# Patient Record
Sex: MALE | Race: WHITE | Employment: OTHER | URBAN - METROPOLITAN AREA
[De-identification: names, ages, dates, MRNs, and addresses within clinical notes are randomized per-mention and may not be internally consistent; named-entity substitution may affect disease eponyms.]

---

## 2019-06-14 ENCOUNTER — HOSPITAL ENCOUNTER (OUTPATIENT)
Dept: PREADMISSION TESTING | Age: 79
Discharge: HOME | End: 2019-06-14
Payer: MEDICARE

## 2019-06-14 VITALS
WEIGHT: 166.25 LBS | TEMPERATURE: 98.1 F | SYSTOLIC BLOOD PRESSURE: 161 MMHG | OXYGEN SATURATION: 96 % | HEIGHT: 67 IN | HEART RATE: 62 BPM | DIASTOLIC BLOOD PRESSURE: 58 MMHG | BODY MASS INDEX: 26.09 KG/M2

## 2019-06-14 LAB
ABO + RH BLD: NORMAL
ANION GAP SERPL CALC-SCNC: 8 MMOL/L (ref 5–15)
APPEARANCE UR: CLEAR
ATRIAL RATE: 54 BPM
BACTERIA URNS QL MICRO: NEGATIVE /HPF
BILIRUB UR QL: NEGATIVE
BLOOD GROUP ANTIBODIES SERPL: NORMAL
BUN SERPL-MCNC: 20 MG/DL (ref 6–20)
BUN/CREAT SERPL: 19 (ref 12–20)
CALCIUM SERPL-MCNC: 8.9 MG/DL (ref 8.5–10.1)
CALCULATED P AXIS, ECG09: 50 DEGREES
CALCULATED R AXIS, ECG10: -20 DEGREES
CALCULATED T AXIS, ECG11: 16 DEGREES
CHLORIDE SERPL-SCNC: 104 MMOL/L (ref 97–108)
CO2 SERPL-SCNC: 26 MMOL/L (ref 21–32)
COLOR UR: NORMAL
CREAT SERPL-MCNC: 1.05 MG/DL (ref 0.7–1.3)
DIAGNOSIS, 93000: NORMAL
EPITH CASTS URNS QL MICRO: NORMAL /LPF
ERYTHROCYTE [DISTWIDTH] IN BLOOD BY AUTOMATED COUNT: 12.3 % (ref 11.5–14.5)
EST. AVERAGE GLUCOSE BLD GHB EST-MCNC: 117 MG/DL
GLUCOSE SERPL-MCNC: 108 MG/DL (ref 65–100)
GLUCOSE UR STRIP.AUTO-MCNC: NEGATIVE MG/DL
HBA1C MFR BLD: 5.7 % (ref 4.2–6.3)
HCT VFR BLD AUTO: 40.5 % (ref 36.6–50.3)
HGB BLD-MCNC: 14 G/DL (ref 12.1–17)
HGB UR QL STRIP: NEGATIVE
HYALINE CASTS URNS QL MICRO: NORMAL /LPF (ref 0–5)
INR PPP: 1 (ref 0.9–1.1)
KETONES UR QL STRIP.AUTO: NEGATIVE MG/DL
LEUKOCYTE ESTERASE UR QL STRIP.AUTO: NEGATIVE
MCH RBC QN AUTO: 31.5 PG (ref 26–34)
MCHC RBC AUTO-ENTMCNC: 34.6 G/DL (ref 30–36.5)
MCV RBC AUTO: 91 FL (ref 80–99)
NITRITE UR QL STRIP.AUTO: NEGATIVE
NRBC # BLD: 0 K/UL (ref 0–0.01)
NRBC BLD-RTO: 0 PER 100 WBC
P-R INTERVAL, ECG05: 174 MS
PH UR STRIP: 7 [PH] (ref 5–8)
PLATELET # BLD AUTO: 266 K/UL (ref 150–400)
PMV BLD AUTO: 11.2 FL (ref 8.9–12.9)
POTASSIUM SERPL-SCNC: 4.1 MMOL/L (ref 3.5–5.1)
PROT UR STRIP-MCNC: NEGATIVE MG/DL
PROTHROMBIN TIME: 9.9 SEC (ref 9–11.1)
Q-T INTERVAL, ECG07: 436 MS
QRS DURATION, ECG06: 94 MS
QTC CALCULATION (BEZET), ECG08: 413 MS
RBC # BLD AUTO: 4.45 M/UL (ref 4.1–5.7)
RBC #/AREA URNS HPF: NORMAL /HPF (ref 0–5)
SODIUM SERPL-SCNC: 138 MMOL/L (ref 136–145)
SP GR UR REFRACTOMETRY: 1.02 (ref 1–1.03)
SPECIMEN EXP DATE BLD: NORMAL
UA: UC IF INDICATED,UAUC: NORMAL
UROBILINOGEN UR QL STRIP.AUTO: 1 EU/DL (ref 0.2–1)
VENTRICULAR RATE, ECG03: 54 BPM
WBC # BLD AUTO: 6.1 K/UL (ref 4.1–11.1)
WBC URNS QL MICRO: NORMAL /HPF (ref 0–4)

## 2019-06-14 PROCEDURE — 81001 URINALYSIS AUTO W/SCOPE: CPT

## 2019-06-14 PROCEDURE — 36415 COLL VENOUS BLD VENIPUNCTURE: CPT

## 2019-06-14 PROCEDURE — 80048 BASIC METABOLIC PNL TOTAL CA: CPT

## 2019-06-14 PROCEDURE — 83036 HEMOGLOBIN GLYCOSYLATED A1C: CPT

## 2019-06-14 PROCEDURE — 85610 PROTHROMBIN TIME: CPT

## 2019-06-14 PROCEDURE — 85027 COMPLETE CBC AUTOMATED: CPT

## 2019-06-14 PROCEDURE — 93005 ELECTROCARDIOGRAM TRACING: CPT

## 2019-06-14 PROCEDURE — 86900 BLOOD TYPING SEROLOGIC ABO: CPT

## 2019-06-14 RX ORDER — LOSARTAN POTASSIUM 100 MG/1
100 TABLET ORAL
COMMUNITY

## 2019-06-14 RX ORDER — ZINC GLUCONATE 10 MG
1 LOZENGE ORAL
COMMUNITY

## 2019-06-14 NOTE — PERIOP NOTES
Preoperative instructions reviewed with patient. Patient given two bottles CHG soap. Instructions reviewed on use of CHG soap. Patient given SSI infection sheet and hand washing tips sheets. MRSA/MSSA treatment instruction sheet given with an explanation to patient that they  will be notified if treatment instructions need to be initiated. Patient was given the opportunity to ask questions on the information provided.

## 2019-06-15 LAB
BACTERIA SPEC CULT: NORMAL
BACTERIA SPEC CULT: NORMAL
SERVICE CMNT-IMP: NORMAL

## 2019-06-20 NOTE — H&P
Brittney Durham  Location: - [facility] OFFICE  Patient #: [id number]  : 1940   / Language: [place] / Race: White  Male      History of Present Illness Teja Payton MD; 2019 4:30 PM)  The patient is a 78year old male who presents for a recheck of Knee Pain. The onset of the knee pain has been gradual and has been occurring in a persistent pattern for months. The course has been gradually worsening. The knee pain is moderate. The knee pain is characterized as a dull aching. The knee pain is described as being located in the entire knee (left). The knee pain is aggravated by kneeling. The knee pain is relieved by rest. Note for \"Knee Pain\": Patient's chief complaint is left knee pain.  We have treated him for several years nonoperatively for his left knee.  He states today that it is time to consider having his knee fixed.  Pain is along the medial joint line but more diffuse.  It is associated with swelling and stiffness.  He has lost ability to perform activities of daily living. [facility] pleased with his right partial knee. Problem List/Past Medical Adriana Mays; 2019 4:03 PM)  Essential Hypertension    OA, KNEE (715.16)    PAIN, KNEE (719.16)    MMT, ACUTE (836.0)    EFFUSION, KNEE (719.06)    Status post right partial knee replacement (V43.65  Z96.651)    REVIEW OF SYSTEMS: Systems were reviewed by the provider.    DIETARY COUNSELING (V65.3)    Primary osteoarthritis of left knee (715.16  M17.12)      Allergies Adriana Mays; 2019 4:03 PM)  No Known Drug Allergies  [2013]:  No Known Allergies  [2013]: Allergies Reconciled      Family History Adriana Ly CMA; 2019 4:03 PM)  Hypertension    Arthritis    Cancer    Colon Cancer      Social History Adriana Ly CMA; 2019 4:03 PM)  Caffeine use   2013: Drinks coffee, tea and soft drinks 1-2 times a day.   Alcohol use   2013: Drinks beer, wine and liquor 5 times per week having 1-2 drinks per occasion, rarely having more than 5 drinks per occasion. Illicit drug use   63/91/5749: none  Seat Belt Use   11/07/2013: always  Sun Exposure   11/07/2013: occasionally  Tobacco use   Former smoker. HIV risk factors   11/07/2013: no  Tobacco / smoke exposure   11/07/2013: No  Exercise   11/07/2013: Walks 3-4 times a week. Medication History Mona GASPARArin Ly [facility]; 4/16/2019 4:03 PM)  HydroCHLOROthiazide  (12.5MG Capsule, Oral) Active. Pravastatin Sodium  (10MG Tablet, Oral) Active. Medications Reconciled     Past Surgical History Mona BHATT Dana Sherie; 4/16/2019 4:03 PM)  Tonsillectomy    Vasectomy    Hand Surgery For Arthritis    Prostatectomy; Abdominal      Diagnostic Studies History Mona Rehman; 4/16/2019 4:03 PM)  Knee X-ray   Date: 4/2/2015, Results: . 3 views of the right knee were obtained. 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 He is now bone-on-bone in medial compartment. Other Problems Mona Abigail Ly [facility]; 4/16/2019 4:03 PM)  Prostate Cancer    Hypercholesterolemia    Skin Cancer          Review of Systems Mona GASPARArin Ly [facility]; 4/16/2019 4:03 PM)  General Not Present- Appetite Loss, Chills, Fatigue, Fever, HIV Exposure, Night Sweats, Persistent Infections, Seasonal Allergies, Weight Gain and Weight Loss. Skin Not Present- Itching, Nail Changes, Poor Wound Healing, Rash, Skin Color Changes, Suspicious Lesions and Yellowish Skin Color. HEENT Present- Decreased Hearing. Not Present- Double Vision, Earache, Hoarseness, Jaundice/Yellow Eyes, Loose Teeth, Nose Bleed, Ringing in the Ears and Sore Throat. Respiratory Not Present- Bloody sputum, Chronic Cough, Difficulty Breathing, Snoring, Wakes up from Sleep Wheezing or Short of Breath and Wheezing.   Cardiovascular Not Present- Bluish Discoloration Of Lips Or Nails, Chest Pain, Difficulty Breathing Lying Down, Difficulty Breathing On Exertion, Leg Cramps With Exertion, Palpitations and Swelling of Extremities. Gastrointestinal Not Present- Abdominal Pain, Black, Tarry Stool, Change in Bowel Habits, Cirrhosis, Constipation, Diarrhea, Difficulty Swallowing, Nausea and Vomiting. Male Genitourinary Not Present- Blood in Urine, Frequency, Painful Urination, Pelvic Pain, Trouble Starting Urinary Stream and Urgency. Musculoskeletal Present- Joint Pain. Not Present- Back Pain, Joint Stiffness and Joint Swelling. Neurological Present- Tingling. Not Present- Fainting, Headaches, Memory Loss, Numbness, Seizures, Tremor, Unsteadiness and Weakness. Psychiatric Not Present- Anxiety, Bipolar and Depression. Endocrine Not Present- Cold Intolerance, Excessive Hunger, Excessive Thirst, Excessive Urination and Heat Intolerance. Hematology Not Present- Abnormal Bruising , Enlarged Lymph Nodes, Excessive bleeding and Skin Discoloration. Vitals Ressie Hui Ly [facility]; 4/16/2019 4:03 PM)  4/16/2019 4:02 PM  Weight: 165 lb   Height: 67 in   Weight was reported by patient. Height was reported by patient. Body Surface Area: 1.86 m²   Body Mass Index: 25.84 kg/m²                Physical Exam Kristine Urias MD; 4/16/2019 4:32 PM)  Musculoskeletal  Global Assessment  Examination of related systems reveals - well-developed, well-nourished, in no acute distress, alert and oriented x 3, no rashes or ulcers of bilateral upper and lower extremities, head or trunk, no generalized swelling or edema of extremities, no digital clubbing or cyanosis, neurovascularly intact globally with normal deep tendon reflexes and normal coordination. Left Lower Extremity - Note: Hip was examined and has painless range of motion with negative impingement and apprehension sign. Straight leg raise and femoral nerve stretch test are negative. Lower extremity has palpable dorsalis pedis pulse. Skin is intact and foot is sensate and well-perfused.  Knee was examined and is ligamentously stable x4. Varus alignment which is somewhat correctable. Varus is about 5°. Pain is present diffusely but mostly along the medial joint line. There is a moderate-sized effusion. His range of motion is 3-115°. Motor testing reveals 5 out of 5 strength of the hip flexors, quads, ankle plantar flexors, and ankle dorsiflexors. Assessment & Plan Viry Vu MD; 4/16/2019 4:34 PM)  Primary osteoarthritis of left knee (715.16  M17.12)  Impression: End-stage medial compartment DJD left knee. Questions were answered. Left partial versus total knee was scheduled. All questions were answered. Current Plans  Pt Education - How to access health information online: discussed with patient and provided information. Pt Education - Educational materials were provided.: discussed with patient and provided information. X-RAY EXAM OF KNEE 3 VIEWS ([id number]) (left PA flex, standing Lateral and bilateral Lavina Patella views were taken today using Digital Radiography.  3 x-ray views of the patient's left knee show bone-on-bone in the medial compartment with medial compartment cysts and osteophyt)  REVIEW OF SYSTEMS: Systems were reviewed by the provider.(V49.9)        Signed by Viry Vu MD

## 2019-06-24 ENCOUNTER — ANESTHESIA EVENT (OUTPATIENT)
Dept: SURGERY | Age: 79
End: 2019-06-24
Payer: MEDICARE

## 2019-06-25 ENCOUNTER — ANESTHESIA (OUTPATIENT)
Dept: SURGERY | Age: 79
End: 2019-06-25
Payer: MEDICARE

## 2019-06-25 ENCOUNTER — HOSPITAL ENCOUNTER (OUTPATIENT)
Age: 79
Setting detail: OBSERVATION
Discharge: HOME HEALTH | End: 2019-06-26
Attending: ORTHOPAEDIC SURGERY | Admitting: ORTHOPAEDIC SURGERY
Payer: MEDICARE

## 2019-06-25 DIAGNOSIS — M17.12 PRIMARY OSTEOARTHRITIS OF LEFT KNEE: Primary | ICD-10-CM

## 2019-06-25 LAB
GLUCOSE BLD STRIP.AUTO-MCNC: 122 MG/DL (ref 65–100)
SERVICE CMNT-IMP: ABNORMAL

## 2019-06-25 PROCEDURE — C9290 INJ, BUPIVACAINE LIPOSOME: HCPCS | Performed by: ORTHOPAEDIC SURGERY

## 2019-06-25 PROCEDURE — 77030027138 HC INCENT SPIROMETER -A

## 2019-06-25 PROCEDURE — 74011250637 HC RX REV CODE- 250/637: Performed by: PHYSICIAN ASSISTANT

## 2019-06-25 PROCEDURE — 74011000258 HC RX REV CODE- 258: Performed by: ORTHOPAEDIC SURGERY

## 2019-06-25 PROCEDURE — 77030012935 HC DRSG AQUACEL BMS -B: Performed by: ORTHOPAEDIC SURGERY

## 2019-06-25 PROCEDURE — 77030018836 HC SOL IRR NACL ICUM -A: Performed by: ORTHOPAEDIC SURGERY

## 2019-06-25 PROCEDURE — 74011250636 HC RX REV CODE- 250/636: Performed by: ANESTHESIOLOGY

## 2019-06-25 PROCEDURE — 74011250636 HC RX REV CODE- 250/636

## 2019-06-25 PROCEDURE — 99218 HC RM OBSERVATION: CPT

## 2019-06-25 PROCEDURE — 74011250636 HC RX REV CODE- 250/636: Performed by: ORTHOPAEDIC SURGERY

## 2019-06-25 PROCEDURE — 77030006822 HC BLD SAW SAG BRSM -B: Performed by: ORTHOPAEDIC SURGERY

## 2019-06-25 PROCEDURE — C1713 ANCHOR/SCREW BN/BN,TIS/BN: HCPCS | Performed by: ORTHOPAEDIC SURGERY

## 2019-06-25 PROCEDURE — 97116 GAIT TRAINING THERAPY: CPT

## 2019-06-25 PROCEDURE — 74011000250 HC RX REV CODE- 250: Performed by: ORTHOPAEDIC SURGERY

## 2019-06-25 PROCEDURE — 77030014125 HC TY EPDRL BBMI -B: Performed by: ANESTHESIOLOGY

## 2019-06-25 PROCEDURE — 77030011640 HC PAD GRND REM COVD -A: Performed by: ORTHOPAEDIC SURGERY

## 2019-06-25 PROCEDURE — C1776 JOINT DEVICE (IMPLANTABLE): HCPCS | Performed by: ORTHOPAEDIC SURGERY

## 2019-06-25 PROCEDURE — 74011000250 HC RX REV CODE- 250

## 2019-06-25 PROCEDURE — 77030020782 HC GWN BAIR PAWS FLX 3M -B

## 2019-06-25 PROCEDURE — 77030031139 HC SUT VCRL2 J&J -A: Performed by: ORTHOPAEDIC SURGERY

## 2019-06-25 PROCEDURE — 77030032490 HC SLV COMPR SCD KNE COVD -B

## 2019-06-25 PROCEDURE — 77030000032 HC CUF TRNQT ZIMM -B: Performed by: ORTHOPAEDIC SURGERY

## 2019-06-25 PROCEDURE — 77030003601 HC NDL NRV BLK BBMI -A

## 2019-06-25 PROCEDURE — 74011250637 HC RX REV CODE- 250/637: Performed by: ORTHOPAEDIC SURGERY

## 2019-06-25 PROCEDURE — 94760 N-INVAS EAR/PLS OXIMETRY 1: CPT

## 2019-06-25 PROCEDURE — 77030010788: Performed by: ORTHOPAEDIC SURGERY

## 2019-06-25 PROCEDURE — 77030028907 HC WRP KNEE WO BGS SOLM -B

## 2019-06-25 PROCEDURE — 76210000006 HC OR PH I REC 0.5 TO 1 HR: Performed by: ORTHOPAEDIC SURGERY

## 2019-06-25 PROCEDURE — 82962 GLUCOSE BLOOD TEST: CPT

## 2019-06-25 PROCEDURE — 97161 PT EVAL LOW COMPLEX 20 MIN: CPT

## 2019-06-25 PROCEDURE — 74011250636 HC RX REV CODE- 250/636: Performed by: PHYSICIAN ASSISTANT

## 2019-06-25 PROCEDURE — 76010000162 HC OR TIME 1.5 TO 2 HR INTENSV-TIER 1: Performed by: ORTHOPAEDIC SURGERY

## 2019-06-25 PROCEDURE — 77030008467 HC STPLR SKN COVD -B: Performed by: ORTHOPAEDIC SURGERY

## 2019-06-25 PROCEDURE — 77030035236 HC SUT PDS STRATFX BARB J&J -B: Performed by: ORTHOPAEDIC SURGERY

## 2019-06-25 PROCEDURE — 76060000034 HC ANESTHESIA 1.5 TO 2 HR: Performed by: ORTHOPAEDIC SURGERY

## 2019-06-25 PROCEDURE — 77030018673: Performed by: ORTHOPAEDIC SURGERY

## 2019-06-25 DEVICE — SYSTEM PART REPL STD CEM POLY ZUK: Type: IMPLANTABLE DEVICE | Site: KNEE | Status: FUNCTIONAL

## 2019-06-25 DEVICE — TIBIA BASE SIZE 5 LEFT                                    MEDIAL/RIGHT LATERAL
Type: IMPLANTABLE DEVICE | Site: KNEE | Status: FUNCTIONAL
Brand: ZUK

## 2019-06-25 DEVICE — FEMORAL SIZE E LT MEDIAL/RT LATERAL
Type: IMPLANTABLE DEVICE | Site: KNEE | Status: FUNCTIONAL
Brand: ZUK

## 2019-06-25 DEVICE — ARTICULAR SURFACE SIZE 5 9MM
Type: IMPLANTABLE DEVICE | Site: KNEE | Status: FUNCTIONAL
Brand: ZUK

## 2019-06-25 DEVICE — CEMENT BNE 40GM FULL DOSE PMMA W/ GENT HI VISC RADPQ LNG: Type: IMPLANTABLE DEVICE | Site: KNEE | Status: FUNCTIONAL

## 2019-06-25 RX ORDER — FENTANYL CITRATE 50 UG/ML
25 INJECTION, SOLUTION INTRAMUSCULAR; INTRAVENOUS
Status: DISCONTINUED | OUTPATIENT
Start: 2019-06-25 | End: 2019-06-25 | Stop reason: HOSPADM

## 2019-06-25 RX ORDER — DIPHENHYDRAMINE HYDROCHLORIDE 50 MG/ML
12.5 INJECTION, SOLUTION INTRAMUSCULAR; INTRAVENOUS AS NEEDED
Status: DISCONTINUED | OUTPATIENT
Start: 2019-06-25 | End: 2019-06-25 | Stop reason: HOSPADM

## 2019-06-25 RX ORDER — ONDANSETRON 2 MG/ML
INJECTION INTRAMUSCULAR; INTRAVENOUS AS NEEDED
Status: DISCONTINUED | OUTPATIENT
Start: 2019-06-25 | End: 2019-06-25 | Stop reason: HOSPADM

## 2019-06-25 RX ORDER — HYDROMORPHONE HYDROCHLORIDE 1 MG/ML
0.2 INJECTION, SOLUTION INTRAMUSCULAR; INTRAVENOUS; SUBCUTANEOUS
Status: ACTIVE | OUTPATIENT
Start: 2019-06-25 | End: 2019-06-25

## 2019-06-25 RX ORDER — CEFAZOLIN SODIUM/WATER 2 G/20 ML
2 SYRINGE (ML) INTRAVENOUS EVERY 8 HOURS
Status: COMPLETED | OUTPATIENT
Start: 2019-06-25 | End: 2019-06-25

## 2019-06-25 RX ORDER — ONDANSETRON 2 MG/ML
4 INJECTION INTRAMUSCULAR; INTRAVENOUS AS NEEDED
Status: DISCONTINUED | OUTPATIENT
Start: 2019-06-25 | End: 2019-06-25 | Stop reason: HOSPADM

## 2019-06-25 RX ORDER — MIDAZOLAM HYDROCHLORIDE 1 MG/ML
1 INJECTION, SOLUTION INTRAMUSCULAR; INTRAVENOUS AS NEEDED
Status: DISCONTINUED | OUTPATIENT
Start: 2019-06-25 | End: 2019-06-25 | Stop reason: HOSPADM

## 2019-06-25 RX ORDER — MIDAZOLAM HYDROCHLORIDE 1 MG/ML
INJECTION, SOLUTION INTRAMUSCULAR; INTRAVENOUS AS NEEDED
Status: DISCONTINUED | OUTPATIENT
Start: 2019-06-25 | End: 2019-06-25 | Stop reason: HOSPADM

## 2019-06-25 RX ORDER — ACETAMINOPHEN 500 MG
1000 TABLET ORAL EVERY 6 HOURS
Status: DISCONTINUED | OUTPATIENT
Start: 2019-06-25 | End: 2019-06-26 | Stop reason: HOSPADM

## 2019-06-25 RX ORDER — SODIUM CHLORIDE, SODIUM LACTATE, POTASSIUM CHLORIDE, CALCIUM CHLORIDE 600; 310; 30; 20 MG/100ML; MG/100ML; MG/100ML; MG/100ML
100 INJECTION, SOLUTION INTRAVENOUS CONTINUOUS
Status: DISCONTINUED | OUTPATIENT
Start: 2019-06-25 | End: 2019-06-25 | Stop reason: HOSPADM

## 2019-06-25 RX ORDER — CELECOXIB 200 MG/1
200 CAPSULE ORAL EVERY 12 HOURS
Status: DISCONTINUED | OUTPATIENT
Start: 2019-06-25 | End: 2019-06-26 | Stop reason: HOSPADM

## 2019-06-25 RX ORDER — LOSARTAN POTASSIUM 50 MG/1
100 TABLET ORAL
Status: DISCONTINUED | OUTPATIENT
Start: 2019-06-25 | End: 2019-06-26 | Stop reason: HOSPADM

## 2019-06-25 RX ORDER — SODIUM CHLORIDE 9 MG/ML
25 INJECTION, SOLUTION INTRAVENOUS CONTINUOUS
Status: DISCONTINUED | OUTPATIENT
Start: 2019-06-25 | End: 2019-06-25 | Stop reason: HOSPADM

## 2019-06-25 RX ORDER — EPHEDRINE SULFATE/0.9% NACL/PF 50 MG/5 ML
SYRINGE (ML) INTRAVENOUS AS NEEDED
Status: DISCONTINUED | OUTPATIENT
Start: 2019-06-25 | End: 2019-06-25 | Stop reason: HOSPADM

## 2019-06-25 RX ORDER — ROPIVACAINE HYDROCHLORIDE 5 MG/ML
150 INJECTION, SOLUTION EPIDURAL; INFILTRATION; PERINEURAL AS NEEDED
Status: COMPLETED | OUTPATIENT
Start: 2019-06-25 | End: 2019-06-25

## 2019-06-25 RX ORDER — MORPHINE SULFATE 10 MG/ML
2 INJECTION, SOLUTION INTRAMUSCULAR; INTRAVENOUS
Status: DISCONTINUED | OUTPATIENT
Start: 2019-06-25 | End: 2019-06-25 | Stop reason: HOSPADM

## 2019-06-25 RX ORDER — SODIUM CHLORIDE 0.9 % (FLUSH) 0.9 %
5-40 SYRINGE (ML) INJECTION EVERY 8 HOURS
Status: DISCONTINUED | OUTPATIENT
Start: 2019-06-25 | End: 2019-06-26 | Stop reason: HOSPADM

## 2019-06-25 RX ORDER — ACETAMINOPHEN 500 MG
1000 TABLET ORAL ONCE
Status: COMPLETED | OUTPATIENT
Start: 2019-06-25 | End: 2019-06-25

## 2019-06-25 RX ORDER — FENTANYL CITRATE 50 UG/ML
50 INJECTION, SOLUTION INTRAMUSCULAR; INTRAVENOUS AS NEEDED
Status: DISCONTINUED | OUTPATIENT
Start: 2019-06-25 | End: 2019-06-25 | Stop reason: HOSPADM

## 2019-06-25 RX ORDER — MIDAZOLAM HYDROCHLORIDE 1 MG/ML
0.5 INJECTION, SOLUTION INTRAMUSCULAR; INTRAVENOUS
Status: DISCONTINUED | OUTPATIENT
Start: 2019-06-25 | End: 2019-06-25 | Stop reason: HOSPADM

## 2019-06-25 RX ORDER — OXYCODONE HYDROCHLORIDE 5 MG/1
5 TABLET ORAL AS NEEDED
Status: DISCONTINUED | OUTPATIENT
Start: 2019-06-25 | End: 2019-06-25 | Stop reason: HOSPADM

## 2019-06-25 RX ORDER — EPHEDRINE SULFATE/0.9% NACL/PF 50 MG/5 ML
5 SYRINGE (ML) INTRAVENOUS AS NEEDED
Status: DISCONTINUED | OUTPATIENT
Start: 2019-06-25 | End: 2019-06-25 | Stop reason: HOSPADM

## 2019-06-25 RX ORDER — NALOXONE HYDROCHLORIDE 0.4 MG/ML
0.4 INJECTION, SOLUTION INTRAMUSCULAR; INTRAVENOUS; SUBCUTANEOUS AS NEEDED
Status: DISCONTINUED | OUTPATIENT
Start: 2019-06-25 | End: 2019-06-26 | Stop reason: HOSPADM

## 2019-06-25 RX ORDER — HYDROCHLOROTHIAZIDE 25 MG/1
25 TABLET ORAL
Status: DISCONTINUED | OUTPATIENT
Start: 2019-06-25 | End: 2019-06-25

## 2019-06-25 RX ORDER — DIPHENHYDRAMINE HCL 12.5MG/5ML
12.5 ELIXIR ORAL
Status: DISCONTINUED | OUTPATIENT
Start: 2019-06-25 | End: 2019-06-26 | Stop reason: HOSPADM

## 2019-06-25 RX ORDER — PROPOFOL 10 MG/ML
INJECTION, EMULSION INTRAVENOUS AS NEEDED
Status: DISCONTINUED | OUTPATIENT
Start: 2019-06-25 | End: 2019-06-25 | Stop reason: HOSPADM

## 2019-06-25 RX ORDER — PROPOFOL 10 MG/ML
INJECTION, EMULSION INTRAVENOUS
Status: DISCONTINUED | OUTPATIENT
Start: 2019-06-25 | End: 2019-06-25 | Stop reason: HOSPADM

## 2019-06-25 RX ORDER — ONDANSETRON 2 MG/ML
4 INJECTION INTRAMUSCULAR; INTRAVENOUS
Status: DISCONTINUED | OUTPATIENT
Start: 2019-06-25 | End: 2019-06-26 | Stop reason: HOSPADM

## 2019-06-25 RX ORDER — SODIUM CHLORIDE 0.9 % (FLUSH) 0.9 %
5-40 SYRINGE (ML) INJECTION AS NEEDED
Status: DISCONTINUED | OUTPATIENT
Start: 2019-06-25 | End: 2019-06-26 | Stop reason: HOSPADM

## 2019-06-25 RX ORDER — FACIAL-BODY WIPES
10 EACH TOPICAL DAILY PRN
Status: DISCONTINUED | OUTPATIENT
Start: 2019-06-27 | End: 2019-06-26 | Stop reason: HOSPADM

## 2019-06-25 RX ORDER — ROPIVACAINE HYDROCHLORIDE 5 MG/ML
INJECTION, SOLUTION EPIDURAL; INFILTRATION; PERINEURAL
Status: COMPLETED | OUTPATIENT
Start: 2019-06-25 | End: 2019-06-25

## 2019-06-25 RX ORDER — TRAMADOL HYDROCHLORIDE 50 MG/1
50 TABLET ORAL
Status: DISCONTINUED | OUTPATIENT
Start: 2019-06-25 | End: 2019-06-26 | Stop reason: HOSPADM

## 2019-06-25 RX ORDER — TRAMADOL HYDROCHLORIDE 50 MG/1
50 TABLET ORAL
Qty: 40 TAB | Refills: 0 | Status: SHIPPED | OUTPATIENT
Start: 2019-06-25 | End: 2019-07-05

## 2019-06-25 RX ORDER — CELECOXIB 200 MG/1
200 CAPSULE ORAL 2 TIMES DAILY
Qty: 30 CAP | Refills: 0 | Status: SHIPPED | OUTPATIENT
Start: 2019-06-25 | End: 2019-07-10

## 2019-06-25 RX ORDER — CETIRIZINE HCL 10 MG
10 TABLET ORAL
Status: DISCONTINUED | OUTPATIENT
Start: 2019-06-25 | End: 2019-06-25

## 2019-06-25 RX ORDER — ACETAMINOPHEN 325 MG/1
650 TABLET ORAL ONCE
Status: DISCONTINUED | OUTPATIENT
Start: 2019-06-25 | End: 2019-06-25 | Stop reason: SDUPTHER

## 2019-06-25 RX ORDER — OXYCODONE HYDROCHLORIDE 5 MG/1
5 TABLET ORAL
Status: DISCONTINUED | OUTPATIENT
Start: 2019-06-25 | End: 2019-06-26 | Stop reason: HOSPADM

## 2019-06-25 RX ORDER — CEFAZOLIN SODIUM/WATER 2 G/20 ML
2 SYRINGE (ML) INTRAVENOUS ONCE
Status: COMPLETED | OUTPATIENT
Start: 2019-06-25 | End: 2019-06-25

## 2019-06-25 RX ORDER — ASPIRIN 325 MG
325 TABLET, DELAYED RELEASE (ENTERIC COATED) ORAL 2 TIMES DAILY
Status: DISCONTINUED | OUTPATIENT
Start: 2019-06-25 | End: 2019-06-26 | Stop reason: HOSPADM

## 2019-06-25 RX ORDER — LIDOCAINE HYDROCHLORIDE 10 MG/ML
0.1 INJECTION, SOLUTION EPIDURAL; INFILTRATION; INTRACAUDAL; PERINEURAL AS NEEDED
Status: DISCONTINUED | OUTPATIENT
Start: 2019-06-25 | End: 2019-06-25 | Stop reason: HOSPADM

## 2019-06-25 RX ORDER — SODIUM CHLORIDE 9 MG/ML
125 INJECTION, SOLUTION INTRAVENOUS CONTINUOUS
Status: DISPENSED | OUTPATIENT
Start: 2019-06-25 | End: 2019-06-26

## 2019-06-25 RX ORDER — SODIUM CHLORIDE, SODIUM LACTATE, POTASSIUM CHLORIDE, CALCIUM CHLORIDE 600; 310; 30; 20 MG/100ML; MG/100ML; MG/100ML; MG/100ML
1000 INJECTION, SOLUTION INTRAVENOUS CONTINUOUS
Status: DISCONTINUED | OUTPATIENT
Start: 2019-06-25 | End: 2019-06-25 | Stop reason: HOSPADM

## 2019-06-25 RX ORDER — HYDROCHLOROTHIAZIDE 25 MG/1
25 TABLET ORAL DAILY
Status: DISCONTINUED | OUTPATIENT
Start: 2019-06-26 | End: 2019-06-26 | Stop reason: HOSPADM

## 2019-06-25 RX ORDER — BUPIVACAINE HYDROCHLORIDE 5 MG/ML
INJECTION, SOLUTION EPIDURAL; INTRACAUDAL AS NEEDED
Status: DISCONTINUED | OUTPATIENT
Start: 2019-06-25 | End: 2019-06-25 | Stop reason: HOSPADM

## 2019-06-25 RX ORDER — DEXAMETHASONE SODIUM PHOSPHATE 4 MG/ML
INJECTION, SOLUTION INTRA-ARTICULAR; INTRALESIONAL; INTRAMUSCULAR; INTRAVENOUS; SOFT TISSUE AS NEEDED
Status: DISCONTINUED | OUTPATIENT
Start: 2019-06-25 | End: 2019-06-25 | Stop reason: HOSPADM

## 2019-06-25 RX ORDER — AMOXICILLIN 250 MG
1 CAPSULE ORAL 2 TIMES DAILY
Status: DISCONTINUED | OUTPATIENT
Start: 2019-06-25 | End: 2019-06-26 | Stop reason: HOSPADM

## 2019-06-25 RX ORDER — ASPIRIN 325 MG
325 TABLET, DELAYED RELEASE (ENTERIC COATED) ORAL 2 TIMES DAILY
Qty: 60 TAB | Refills: 0 | Status: SHIPPED | OUTPATIENT
Start: 2019-06-25

## 2019-06-25 RX ORDER — POLYETHYLENE GLYCOL 3350 17 G/17G
17 POWDER, FOR SOLUTION ORAL DAILY
Status: DISCONTINUED | OUTPATIENT
Start: 2019-06-26 | End: 2019-06-26 | Stop reason: HOSPADM

## 2019-06-25 RX ORDER — CELECOXIB 200 MG/1
200 CAPSULE ORAL ONCE
Status: COMPLETED | OUTPATIENT
Start: 2019-06-25 | End: 2019-06-25

## 2019-06-25 RX ORDER — MORPHINE SULFATE 2 MG/ML
1 INJECTION, SOLUTION INTRAMUSCULAR; INTRAVENOUS
Status: DISCONTINUED | OUTPATIENT
Start: 2019-06-25 | End: 2019-06-26 | Stop reason: HOSPADM

## 2019-06-25 RX ADMIN — SODIUM CHLORIDE, SODIUM LACTATE, POTASSIUM CHLORIDE, AND CALCIUM CHLORIDE 1000 ML: 600; 310; 30; 20 INJECTION, SOLUTION INTRAVENOUS at 06:54

## 2019-06-25 RX ADMIN — ACETAMINOPHEN 1000 MG: 500 TABLET ORAL at 23:46

## 2019-06-25 RX ADMIN — Medication 2 G: at 07:49

## 2019-06-25 RX ADMIN — CELECOXIB 200 MG: 200 CAPSULE ORAL at 20:59

## 2019-06-25 RX ADMIN — TRANEXAMIC ACID 1 G: 100 INJECTION, SOLUTION INTRAVENOUS at 07:40

## 2019-06-25 RX ADMIN — SODIUM CHLORIDE 125 ML/HR: 900 INJECTION, SOLUTION INTRAVENOUS at 22:05

## 2019-06-25 RX ADMIN — MIDAZOLAM HYDROCHLORIDE 2 MG: 1 INJECTION, SOLUTION INTRAMUSCULAR; INTRAVENOUS at 07:10

## 2019-06-25 RX ADMIN — ACETAMINOPHEN 1000 MG: 500 TABLET ORAL at 17:30

## 2019-06-25 RX ADMIN — ROPIVACAINE HYDROCHLORIDE 20 ML: 5 INJECTION, SOLUTION EPIDURAL; INFILTRATION; PERINEURAL at 07:10

## 2019-06-25 RX ADMIN — MIDAZOLAM HYDROCHLORIDE 2 MG: 1 INJECTION, SOLUTION INTRAMUSCULAR; INTRAVENOUS at 07:24

## 2019-06-25 RX ADMIN — ONDANSETRON 4 MG: 2 INJECTION INTRAMUSCULAR; INTRAVENOUS at 07:33

## 2019-06-25 RX ADMIN — SODIUM CHLORIDE 125 ML/HR: 900 INJECTION, SOLUTION INTRAVENOUS at 09:45

## 2019-06-25 RX ADMIN — Medication 10 MG: at 08:10

## 2019-06-25 RX ADMIN — PROPOFOL 20 MG: 10 INJECTION, EMULSION INTRAVENOUS at 07:27

## 2019-06-25 RX ADMIN — PROPOFOL 50 MCG/KG/MIN: 10 INJECTION, EMULSION INTRAVENOUS at 07:30

## 2019-06-25 RX ADMIN — BUPIVACAINE HYDROCHLORIDE 10 MG: 5 INJECTION, SOLUTION EPIDURAL; INTRACAUDAL at 07:29

## 2019-06-25 RX ADMIN — LOSARTAN POTASSIUM 100 MG: 50 TABLET ORAL at 22:05

## 2019-06-25 RX ADMIN — DEXAMETHASONE SODIUM PHOSPHATE 4 MG: 4 INJECTION, SOLUTION INTRA-ARTICULAR; INTRALESIONAL; INTRAMUSCULAR; INTRAVENOUS; SOFT TISSUE at 07:33

## 2019-06-25 RX ADMIN — CELECOXIB 200 MG: 200 CAPSULE ORAL at 06:59

## 2019-06-25 RX ADMIN — ROPIVACAINE HYDROCHLORIDE 100 MG: 5 INJECTION, SOLUTION EPIDURAL; INFILTRATION; PERINEURAL at 07:10

## 2019-06-25 RX ADMIN — SODIUM CHLORIDE 125 ML/HR: 900 INJECTION, SOLUTION INTRAVENOUS at 13:51

## 2019-06-25 RX ADMIN — FENTANYL CITRATE 50 MCG: 50 INJECTION, SOLUTION INTRAMUSCULAR; INTRAVENOUS at 07:10

## 2019-06-25 RX ADMIN — Medication 2 G: at 23:47

## 2019-06-25 RX ADMIN — Medication 10 MG: at 07:39

## 2019-06-25 RX ADMIN — ACETAMINOPHEN 1000 MG: 500 TABLET ORAL at 06:59

## 2019-06-25 RX ADMIN — Medication 2 G: at 16:34

## 2019-06-25 RX ADMIN — ASPIRIN 325 MG: 325 TABLET, DELAYED RELEASE ORAL at 17:30

## 2019-06-25 RX ADMIN — SENNOSIDES, DOCUSATE SODIUM 1 TABLET: 50; 8.6 TABLET, FILM COATED ORAL at 17:30

## 2019-06-25 RX ADMIN — ACETAMINOPHEN 1000 MG: 500 TABLET ORAL at 12:59

## 2019-06-25 NOTE — OP NOTES
Name: Juanito Lomeli  MRN:  [id number]  : 1940  Age:  78 y.o. Surgery Date: 2019      OPERATIVE REPORT   LEFT UNCOMPARTMENTAL KNEE REPLACEMENT    PREOPERATIVE DIAGNOSIS: Osteoarthritis medial compartment, left knee. POSTOPERATIVE DIAGNOSIS: Osteoarthritis medial compartment, left knee. PROCEDURE PERFORMED: Left unicompartmental knee arthroplasty. SURGEON: Todd Giordano MD    FIRST ASSISTANT:  Dari Waldrop PA-C    ANESTHESIA: Spinal    PRE-OP ANTIBIOTIC: Ancef 2g    COMPLICATIONS: None. ESTIMATED BLOOD LOSS: 50 mL. SPECIMENS REMOVED: None. COMPONENTS IMPLANTED:   Implant Name Type Inv. Item Serial No.  Lot No. LRB No. Used Action   CEMENT BNE GENTAMC GHV 40GM -- SMARTSET - SN/A  CEMENT BNE GENTAMC GHV 40GM -- SMARTSET N/A [facility] [employer] [id number] Left 1 Implanted   TRAY TIB UNI PC LM/RL 5 -- ZUK - SN/A  TRAY TIB UNI PC LM/RL 5 -- ZUK N/A [employer] [id number] Left 1 Implanted   INSERT TIB UNI 5 9MM -- ZUK - SN/A  INSERT TIB UNI 5 9MM -- ZUK N/A [employer] [id number] Left 1 Implanted   FEM UNI PC HI-FLX LM/RL SZ-E -- ZUK - SN/A  FEM UNI PC HI-FLX LM/RL SZ-E -- ZUK N/A [employer] [id number] Left 1 Implanted       INDICATIONS: The patient is an 78 yrs male with progressive debilitating left knee pain due to severe osteoarthritis. Symptoms have progressed despite comprehensive conservative treatment and the patient presents for left unicompartmental knee replacement. Risks, benefits, alternatives of the procedure were reviewed in detail and the patient desires to proceed. The patient understands the increased risk for perioperative medical complications. DESCRIPTION OF PROCEDURE: Anesthetic was initiated. Preoperative dose of antibiotic was given. Left side was confirmed as the operative side, prepped and draped in the usual sterile fashion. Skin was covered with Ioban occlusive dressing.     Medial parapatellar approach was made to the left knee. The knee was examined. Severe medial compartment disease. No issues on the lateral or patellofemoral joints. The knee was exposed. The extramedullary alignment guide was used to make a proximal tibial cut. The femur was cut parallel to the tibia with a spacer block technique. The femur was sized for a E. The cutting block was placed and provisionally pinned, examined the flexion gap, translated the femoral component as far lateral as possible and created a rectangular flexion gap with rotation. The femur was pinned and finished for a E, tibia was prepared for a 5. The meniscus was removed. Bony surfaces were drilled and the sclerotic areas lavaged and dried the bony surfaces and implanted a size 5 tibia, size E femur, and a 9 mm poly. Cement was allowed to fully cure with no motion and all excess cement was removed. At this point, the knee was ranged, irrigated copiously with pulsatile lavage. Soft tissues were infiltrated with local anesthetic. The arthrotomy was closed with #2 Vicryl sutures and 0 Vicryl sutures. Deep wound was again irrigated. Skin and subcutaneous were closed in standard fashion. Sterile dressing was applied. There were no complications. No specimen was sent. Procedure was LEFT UNICOMPARTMENTAL KNEE REPLACEMENT. The patient was taken to the recovery room in good condition. Dari Waldrop PA-C was critical throughout the case to assist with positioning, retraction and closure. There were no other available residents or surgical assistants to assist during this procedure.     Todd Giordano MD

## 2019-06-25 NOTE — PERIOP NOTES
TRANSFER - OUT REPORT:    Verbal report given to NURSE CARLOS(name) on Andrea Hall  being transferred to [facility](unit) for routine post - op       Report consisted of patients Situation, Background, Assessment and   Recommendations(SBAR). Time Pre op antibiotic given:MIC 2 Harvey@[employer]  Anesthesia Stop time: 2055  Saavedra Present on Transfer to [address] for Saavedra on Chart:NO  Discharge Prescriptions with Chart:NO    Information from the following report(s) SBAR, OR Summary, Intake/Output, MAR, Recent Results, Cardiac Rhythm NSR and Procedure Verification was reviewed with the receiving nurse. Opportunity for questions and clarification was provided. Is the patient on 02? NO      Is the patient on a monitor? NO    Is the nurse transporting with the patient? NO    Surgical Waiting Area notified of patient's transfer from PACU? YES      The following personal items collected during your admission accompanied patient upon transfer:   Dental Appliance: Dental Appliances: None  Vision: Visual Aid: Glasses  Hearing Aid: Hearing Aid: Bilateral  Jewelry: Jewelry: None  Clothing: Clothing: Other (comment)(CLOTHING & SHOES TO PACU)  Other Valuables: Other Valuables:  Other (comment)(TERRANCE HEARING AIDS TO PACU)  Valuables sent to safe:

## 2019-06-25 NOTE — PROGRESS NOTES
The following herbal, alternative, and/or nutritional/dietary supplement product(s) has been discontinued  per [facility] approved policy:      Magnesium - 250 mg EVERY BEDTIME    Please reorder upon discharge if appropriate.

## 2019-06-25 NOTE — ANESTHESIA PROCEDURE NOTES
Peripheral Block    Start time: 6/25/2019 7:02 AM  End time: 6/25/2019 7:11 AM  Performed by: Adolph Greene MD  Authorized by: Adolph Greene MD       Pre-procedure: Indications: at surgeon's request and post-op pain management    Preanesthetic Checklist: patient identified, risks and benefits discussed, site marked, timeout performed, anesthesia consent given and patient being monitored    Timeout Time: 07:02          Block Type:   Block Type:   Adductor canal  Laterality:  Left  Monitoring:  Standard ASA monitoring, continuous pulse ox, frequent vital sign checks, heart rate, responsive to questions and oxygen  Injection Technique:  Single shot  Procedures: ultrasound guided    Patient Position: supine  Prep: chlorhexidine    Location:  Mid thigh  Needle Type:  Stimuplex  Needle Gauge:  22 G  Needle Localization:  Ultrasound guidance    Assessment:  Number of attempts:  1  Injection Assessment:  Incremental injection every 5 mL, local visualized surrounding nerve on ultrasound, negative aspiration for blood, no paresthesia, no intravascular symptoms and negative aspiration for CSF  Patient tolerance:  Patient tolerated the procedure well with no immediate complications

## 2019-06-25 NOTE — ANESTHESIA PROCEDURE NOTES
Spinal Block    Start time: 6/25/2019 7:25 AM  End time: 6/25/2019 7:29 AM  Performed by: Radha Ma MD  Authorized by: Radha Ma MD     Pre-procedure:   Indications: primary anesthetic  Preanesthetic Checklist: patient identified, risks and benefits discussed, anesthesia consent, site marked, patient being monitored and timeout performed    Timeout Time: 07:25          Spinal Block:   Patient Position:  Seated  Prep Region:  Lumbar  Prep: DuraPrep and patient draped      Location:  L3-4  Technique:  Single shot        Needle:   Needle Type:  Pencil-tip  Needle Gauge:  25 G  Attempts:  1      Events: CSF confirmed, no blood with aspiration and no paresthesia        Assessment:  Insertion:  Uncomplicated  Patient tolerance:  Patient tolerated the procedure well with no immediate complications

## 2019-06-25 NOTE — ANESTHESIA PREPROCEDURE EVALUATION
Relevant Problems   No relevant active problems       Anesthetic History   No history of anesthetic complications            Review of Systems / Medical History  Patient summary reviewed, nursing notes reviewed and pertinent labs reviewed    Pulmonary  Within defined limits                 Neuro/Psych   Within defined limits           Cardiovascular    Hypertension                   GI/Hepatic/Renal     GERD           Endo/Other        Arthritis and cancer     Other Findings              Physical Exam    Airway  Mallampati: II  TM Distance: > 6 cm  Neck ROM: normal range of motion   Mouth opening: Normal     Cardiovascular  Regular rate and rhythm,  S1 and S2 normal,  no murmur, click, rub, or gallop             Dental  No notable dental hx       Pulmonary  Breath sounds clear to auscultation               Abdominal  GI exam deferred       Other Findings            Anesthetic Plan    ASA: 2  Anesthesia type: spinal      Post-op pain plan if not by surgeon: intrathecal opiates      Anesthetic plan and risks discussed with: Patient

## 2019-06-25 NOTE — PROGRESS NOTES
Primary Nurse Markel Barton and Zoey Lane RN performed a dual skin assessment on this patient Impairment noted- see wound doc flow sheet  Jm score is 21  Patient has shingles that are crusting over on the right side of his head and neck and chest  Patient also has a small open wound on his right leg; and right arm scrape

## 2019-06-25 NOTE — PROGRESS NOTES
Ortho Post-Op Note    6/25/2019  4:40 PM    POD:  Day of Surgery  S/P:  Procedure(s):  LEFT KNEE  UNICONDYLAR  ARTHROPLASTY    Afebrile/BP's running a bit high- normally takes HCTZ, and Cozaar at night  Doing well without complaints of nausea  Pain well controlled  Calves soft/NTTP Bilaterally  Dressing clean and dry  Moving lower extremities well. Neurocirculatory exam intact and within normal range. Able to SLR  Lab Results   Component Value Date/Time    HGB 14.0 06/14/2019 03:47 PM    INR 1.0 06/14/2019 03:47 PM     Recent Labs     06/14/19  1547   CREA 1.05   BUN 20     Estimated Creatinine Clearance: 51.6 mL/min (based on SCr of 1.05 mg/dL). PLAN: Begin HCTZ in am tomorrow.  Cozaar tonight  DVT prophylaxis:  mg bid  WBAT with PT-mobilization  Pain Control- tramadol, tylenol, toradol, and ice  Plan to D/C home tomorrow after PT      [place] PETR [place]

## 2019-06-25 NOTE — PROGRESS NOTES
Problem: Mobility Impaired (Adult and Pediatric)  Goal: *Acute Goals and Plan of Care (Insert Text)  Description  Physical Therapy Goals  Initiated 6/25/2019    1. Patient will move from supine to sit and sit to supine  and roll side to side in bed with modified independence within 4 days. 2. Patient will perform sit to stand with modified independence within 4 days. 3. Patient will ambulate with modified independence for 150 feet with the least restrictive device within 4 days. 4. Patient will ascend/descend 4 stairs with 1 handrail(s) with modified independence within 4 days. 5. Patient will perform home exercise program per protocol with modified independence within 4 days. 6. Patient will demonstrate AROM 0-90 degrees in operative joint within 4 days. Outcome: Progressing Towards Goal   PHYSICAL THERAPY EVALUATION  Patient: Ebony Myers (1 y.o. 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 Patient was able to mobilize with min assist overall and VSS. He has excellent pain control, as well. Patient underwent the same procedure on the R side 4 years ago and has had a very good outcome since. He still has a walker at home and will not need to be able to manage any stairs at home. Expect that he will progress well and be able to return home with HHPT likely after AM session tomorrow if medically ready. Patient will benefit from skilled intervention to address the above impairments. Patient?s rehabilitation potential is considered to be Good  Factors which may influence rehabilitation potential include:   ? None noted  ? Mental ability/status  ? Medical condition  ?          Home/family situation and support systems  ? Safety awareness  ? Pain tolerance/management  ? Other:      PLAN :  Recommendations and Planned Interventions:  ?           Bed Mobility Training             ? Neuromuscular Re-Education  ? Transfer Training                   ? Orthotic/Prosthetic Training  ? Gait Training                         ? Modalities  ? Therapeutic Exercises           ? Edema Management/Control  ? Therapeutic Activities            ? Patient and Family Training/Education  ? Other (comment):    Frequency/Duration: Patient will be followed by physical therapy  twice daily to address goals. Discharge Recommendations: Home Health  Further Equipment Recommendations for Discharge: none      SUBJECTIVE:   Patient stated ? I feel pretty good actually. ?    OBJECTIVE DATA SUMMARY:   HISTORY:    Past Medical History:   Diagnosis Date    Arthritis     Chronic pain     HANDS, SSHOULDERS, KNEE    Epistaxis     GERD (gastroesophageal reflux disease)     HTN (hypertension)     Hypercholesteremia     Ill-defined condition 8/10/15    RECTAL BLEEDING; SEEING DR.ANDY CORTES; HAD ANOTHER EPISODE 2014, COLONOSCOPY NEG-    Knee pain     Prostate cancer ([facility] [address]     Vertigo     H/O VERTIGO     Past Surgical History:   Procedure Laterality Date    HX COLONOSCOPY      HX COLONOSCOPY  2013    HX HEENT      nasal artery cautery for NOSE BLEED    HX HERNIA REPAIR  X3    INGUINAL    HX ORTHOPAEDIC  09/08/2015    RIGHT PARTIAL KNEE REPLACEMENT    HX OTHER SURGICAL      SKIN CANCERS EXCISED    HX UROLOGICAL  2008    PROSTATECTOMY     Prior Level of Function/Home Situation: independent, lives with wife, able to remain on 1 level of home  Personal factors and/or comorbidities impacting plan of care: L uni knee replacement         EXAMINATION/PRESENTATION/DECISION MAKING:   Critical Behavior:  Neurologic State: Alert           Hearing:     Skin:  post op bandage in place with no drainage noted on ace  Edema: none  Range Of Motion:  AROM: Within functional limits(L knee limited by post op dressing, but 0-90 with activity)                       Strength:    Strength: Within functional limits(LEs 4-/5)                    Tone & Sensation:   Tone: Normal              Sensation: Intact               Coordination:  Coordination: Within functional limits  Vision:      Functional Mobility:  Bed Mobility:     Supine to Sit: Contact guard assistance; Additional time  Sit to Supine: Contact guard assistance; Additional time     Transfers:  Sit to Stand: Adaptive equipment; Additional time;Contact guard assistance  Stand to Sit: Contact guard assistance; Adaptive equipment; Additional time                       Balance:   Sitting: Intact; Without support  Standing: Intact; With support  Ambulation/Gait Training:  Distance (ft): 50 Feet (ft)  Assistive Device: Gait belt;Walker, rolling  Ambulation - Level of Assistance: Minimal assistance; Adaptive equipment; Additional time        Gait Abnormalities: Antalgic;Decreased step clearance; Step to gait(progressed to step through gait pattern quickly)  Right Side Weight Bearing: Full  Left Side Weight Bearing: As tolerated  Base of Support: Widened  Stance: Left decreased  Speed/Rizwana: Pace decreased (<100 feet/min)  Step Length: Left shortened;Right shortened  Swing Pattern: Left asymmetrical     Interventions: Safety awareness training; Tactile cues; Verbal cues; Visual/Demos            Stairs: Therapeutic Exercises:    Ankle pumps, heel slides    Functional Measure:  Barthel Index:    Bathin  Bladder: 10  Bowels: 10  Groomin  Dressin  Feeding: 10  Mobility: 0  Stairs: 0  Toilet Use: 5  Transfer (Bed to Chair and Back): 10  Total: 55/100       Percentage of impairment   0%   1-19%   20-39%   40-59%   60-79%   80-99%   100%   Barthel Score 0-100 100 99-80 79-60 59-40 20-39 1-19   0     The Barthel ADL Index: Guidelines  1. The index should be used as a record of what a patient does, not as a record of what a patient could do. 2. The main aim is to establish degree of independence from any help, physical or verbal, however minor and for whatever reason. 3. The need for supervision renders the patient not independent. 4. A patient's performance should be established using the best available evidence. Asking the patient, friends/relatives and nurses are the usual sources, but direct observation and common sense are also important. However direct testing is not needed. 5. Usually the patient's performance over the preceding 24-48 hours is important, but occasionally longer periods will be relevant. 6. Middle categories imply that the patient supplies over 50 per cent of the effort. 7. Use of aids to be independent is allowed. Link ., Barthel, DArinW. (3981). Functional evaluation: the Barthel Index. [address] [facility] (14)2. FELIX Mcfarland, Eduar Rico., Edgar Muir., Nikhil, [address] [place] [address] (1999). Measuring the change indisability after inpatient rehabilitation; comparison of the responsiveness of the Barthel Index and Functional Barnwell Measure. Journal of Neurology, Neurosurgery, and Psychiatry, 66(4), 376-628. Carmen Garcia, N.J.A, JUSTINO Doan.DANIE, & Belen Bryant, M.A. (2004.) Assessment of post-stroke quality of life in cost-effectiveness studies: The usefulness of the Barthel Index and the EuroQoL-5D.  Quality of Life Research, 15, 043-80           Physical Therapy Evaluation Charge Determination   History Examination Presentation Decision-Making   MEDIUM  Complexity : 1-2 comorbidities / personal factors will impact the outcome/ POC  MEDIUM Complexity : 3 Standardized tests and measures addressing body structure, function, activity limitation and / or participation in recreation  LOW Complexity : Stable, uncomplicated  LOW Complexity : FOTO score of       Based on the above components, the patient evaluation is determined to be of the following complexity level: LOW     Pain:  Pain Scale 1: Numeric (0 - 10)  Pain Intensity 1: 0  Pain Location 1: Knee  Pain Orientation 1: Left  Pain Description 1: Aching     Activity Tolerance:   Good  Please refer to the flowsheet for vital signs taken during this treatment. After treatment:   ?         Patient left in no apparent distress sitting up in chair  ? Patient left in no apparent distress in bed  ? Call bell left within reach  ? Nursing notified  ? Caregiver present  ? Bed alarm activated    COMMUNICATION/EDUCATION:   The patient?s plan of care was discussed with: Registered Nurse. ?         Fall prevention education was provided and the patient/caregiver indicated understanding. ? Patient/family have participated as able in goal setting and plan of care. ?         Patient/family agree to work toward stated goals and plan of care. ?         Patient understands intent and goals of therapy, but is neutral about his/her participation. ? Patient is unable to participate in goal setting and plan of care.     Thank you for this referral.  Angelita Neri, PT   Time Calculation: 18 mins

## 2019-06-25 NOTE — ANESTHESIA POSTPROCEDURE EVALUATION
Post-Anesthesia Evaluation and Assessment    Patient: Saman Suero MRN: [id number]  SSN: [id number]    YOB: 1940  Age: 78 y.o. Sex: male      I have evaluated the patient and they are stable and ready for discharge from the PACU. Cardiovascular Function/Vital Signs  Visit Vitals  /52   Pulse 69   Temp 36.6 °C (97.9 °F)   Resp 18   Ht 5' 7\" (1.702 m)   Wt 75.4 kg (166 lb 4 oz)   SpO2 99%   BMI 26.04 kg/m²       Patient is status post Spinal anesthesia for Procedure(s):  LEFT KNEE  UNICONDYLAR  ARTHROPLASTY. Nausea/Vomiting: None    Postoperative hydration reviewed and adequate. Pain:  Pain Scale 1: Numeric (0 - 10) (06/25/19 0910)  Pain Intensity 1: 0 (06/25/19 0910)   Managed    Neurological Status:   Neuro (WDL): Exceptions to WDL (06/25/19 0910)  Neuro  Neurologic State: Anesthetized; Eyes open to voice (06/25/19 0910)  LUE Motor Response: Purposeful (06/25/19 0910)  LLE Motor Response: Numbness; Pharmacologically paralyzed (06/25/19 0910)  RUE Motor Response: Purposeful (06/25/19 0910)  RLE Motor Response: Numbness; Pharmacologically paralyzed (06/25/19 0910)   At baseline    Mental Status, Level of Consciousness: Alert and  oriented to person, place, and time    Pulmonary Status:   O2 Device: Nasal cannula (06/25/19 0912)   Adequate oxygenation and airway patent    Complications related to anesthesia: None    Post-anesthesia assessment completed. No concerns    Signed By: Alexander Monroe MD     June 25, 2019              Procedure(s):  LEFT KNEE  UNICONDYLAR  ARTHROPLASTY. spinal    <BSHSIANPOST>    Vitals Value Taken Time   /50 6/25/2019  9:20 AM   Temp 36.6 °C (97.9 °F) 6/25/2019  9:12 AM   Pulse 64 6/25/2019  9:27 AM   Resp 18 6/25/2019  9:27 AM   SpO2 100 % 6/25/2019  9:27 AM   Vitals shown include unvalidated device data.

## 2019-06-25 NOTE — PROGRESS NOTES
Bedside shift change report given to Sofía  (oncoming nurse) by Silver Holcomb (offgoing nurse). Report included the following information SBAR, Kardex, Intake/Output and MAR.

## 2019-06-25 NOTE — H&P
Date of Surgery Update:  Lakisha Olguin was seen and examined. History and physical has been reviewed. The patient has been examined.  There have been no significant clinical changes since the completion of the originally dated History and Physical.    Signed By: Bonita Jaramillo MD     June 25, 2019 6:59 AM

## 2019-06-26 VITALS
WEIGHT: 166.25 LBS | HEIGHT: 67 IN | OXYGEN SATURATION: 98 % | HEART RATE: 69 BPM | DIASTOLIC BLOOD PRESSURE: 78 MMHG | SYSTOLIC BLOOD PRESSURE: 156 MMHG | TEMPERATURE: 98.2 F | RESPIRATION RATE: 16 BRPM | BODY MASS INDEX: 26.09 KG/M2

## 2019-06-26 LAB
ANION GAP SERPL CALC-SCNC: 7 MMOL/L (ref 5–15)
BUN SERPL-MCNC: 16 MG/DL (ref 6–20)
BUN/CREAT SERPL: 15 (ref 12–20)
CALCIUM SERPL-MCNC: 8.2 MG/DL (ref 8.5–10.1)
CHLORIDE SERPL-SCNC: 108 MMOL/L (ref 97–108)
CO2 SERPL-SCNC: 22 MMOL/L (ref 21–32)
CREAT SERPL-MCNC: 1.09 MG/DL (ref 0.7–1.3)
GLUCOSE SERPL-MCNC: 117 MG/DL (ref 65–100)
HGB BLD-MCNC: 11.9 G/DL (ref 12.1–17)
POTASSIUM SERPL-SCNC: 4.1 MMOL/L (ref 3.5–5.1)
SODIUM SERPL-SCNC: 137 MMOL/L (ref 136–145)

## 2019-06-26 PROCEDURE — 97110 THERAPEUTIC EXERCISES: CPT

## 2019-06-26 PROCEDURE — 85018 HEMOGLOBIN: CPT

## 2019-06-26 PROCEDURE — 97116 GAIT TRAINING THERAPY: CPT

## 2019-06-26 PROCEDURE — 36415 COLL VENOUS BLD VENIPUNCTURE: CPT

## 2019-06-26 PROCEDURE — 74011250637 HC RX REV CODE- 250/637: Performed by: PHYSICIAN ASSISTANT

## 2019-06-26 PROCEDURE — 99218 HC RM OBSERVATION: CPT

## 2019-06-26 PROCEDURE — 80048 BASIC METABOLIC PNL TOTAL CA: CPT

## 2019-06-26 RX ORDER — AMOXICILLIN 250 MG
1 CAPSULE ORAL 2 TIMES DAILY
Qty: 30 TAB | Refills: 1 | Status: SHIPPED | OUTPATIENT
Start: 2019-06-26

## 2019-06-26 RX ADMIN — HYDROCHLOROTHIAZIDE 25 MG: 25 TABLET ORAL at 08:53

## 2019-06-26 RX ADMIN — ASPIRIN 325 MG: 325 TABLET, DELAYED RELEASE ORAL at 08:53

## 2019-06-26 RX ADMIN — CELECOXIB 200 MG: 200 CAPSULE ORAL at 08:53

## 2019-06-26 RX ADMIN — ACETAMINOPHEN 1000 MG: 500 TABLET ORAL at 08:54

## 2019-06-26 RX ADMIN — SENNOSIDES, DOCUSATE SODIUM 1 TABLET: 50; 8.6 TABLET, FILM COATED ORAL at 08:53

## 2019-06-26 NOTE — DISCHARGE INSTRUCTIONS
Discharge Instructions Unicondylar Knee Replacement  Dr. Rose Beach    Patient Name: Saman Suero  Date of procedure: 6/25/2019   Procedure: Procedure(s):  LEFT KNEE  UNICONDYLAR  ARTHROPLASTY  Surgeon: Georjean Olszewski) and Role:     * Hunter Grider MD - Primary  PCP: Katia Ceballos MD  Date of discharge: No discharge date for patient encounter. Follow up appointments   Follow up with Dr. Rose Beach in 3 weeks. Call [phone] to make an appointment.  If home health has been arranged for you the agency will contact you to arrange dates/times for visits. Please call them if you do not hear from them within 24 hours after you are discharged    When to call your Orthopaedic Surgeon: Call [phone]. If you call after 5pm or on a weekend, the on call physician will be contacted   Unrelieved pain   Signs of infection-if your incision is red; continues to have drainage; drainage has a foul odor or if you have a persistent fever over 101 degrees   Signs of a blood clot in your leg-calf pain, tenderness, redness, swelling of lower leg    When to call your Primary Care Physician:   Concerns about medical conditions such as diabetes, high blood pressure, asthma, congestive heart failure   Call if blood sugars are elevated, persistent headache or dizziness, coughing or congestion, constipation or diarrhea, burning with urination, abnormal heart rate    When to call [id number] go to the nearest emergency room   Acute onset of chest pain, shortness of breath, difficulty breathing      Activity   Weight bearing as tolerated with walker or crutches.  Refer to pages 23-31 of your handbook for instructions and pictures   Complete your Home Exercise Program daily as instructed by your therapist.  Refer to pages 33-41 of your handbook for instructions and pictures   Get up every one hour and walk (except at night when sleeping)   Do not drive or operate heavy machinery    Incision Care   The Aquacel (Orien Handler, waterproof) surgical dressing is to remain on your knee for 7 days. On the 7th day have someone gently peel the dressing off by carefully lifting the edge and stretching it slightly to break the adhesive seal and leave incision open to air. You may take a shower with the Aquacel dressing in place.  You do have staples in your knee incision; if present they will be removed by the [facility] staff in 10-14 days and steri-strips will be applied. Leave the steri-strips on until they fall off    Preventing blood clots    Take Aspirin as prescribed. Pain management   Keep ice wrap in place except when walking; changing gel packs every 4 hours   Lie down and elevate your leg on pillows for about 30 minutes after walking to decrease swelling and pain   Do ankle pumps (10 repetitions) every hour while awake and get up frequently to walk   Take narcotic pain pill as prescribed if needed. Take with food; avoid alcohol while taking pain medication. Decrease the amount of narcotic pain medication as your pain lessens   Do not take any over-the-counter medication except for Tylenol (acetaminophen). Please be aware that many medications contain Tylenol. We do not want you to take too much Tylenol so please use this as your guide:  o Do not take more than 3 Grams of Tylenol in a 24 hour period. (There are 1000 milligrams in one Gram  o 325mg of Tylenol per tablet (do not take more than 9 tablets in 24 hours)  o 500mg of Tylenol per tablet (do not take more than 6 tablets in 24 hours)    Diet   Resume usual diet; drink plenty of fluids; eat foods high in fiber   You may want to take a stool softener (such as Senokot-S or Colace) to prevent constipation while you are taking pain medication.   If constipation occurs, take a laxative (such as Dulcolax tablets, Milk of Magnesia, or a suppository)

## 2019-06-26 NOTE — PROGRESS NOTES
Complaints: none  Events: none      GEN:  NAD. AOx3   ABD:  S/NT/ND   LLE:  Dressing C/D/I    5/5 motor    Calf nttp (Bilat)    Sensate all distribution to light touch    1+ dp/pt pulses, foot perfused      Lab Results   Component Value Date/Time    HGB 11.9 (L) 06/26/2019 03:10 AM    INR 1.0 06/14/2019 03:47 PM       Lab Results   Component Value Date/Time    Sodium 137 06/26/2019 03:10 AM    Potassium 4.1 06/26/2019 03:10 AM    Chloride 108 06/26/2019 03:10 AM    CO2 22 06/26/2019 03:10 AM    BUN 16 06/26/2019 03:10 AM    Creatinine 1.09 06/26/2019 03:10 AM    Calcium 8.2 (L) 06/26/2019 03:10 AM            POD #1 LEFT PARTIAL KNEE REPLACEMENT. Satisfactory progress. Patient is doing well. Pain is currently well-controlled. Plan for discharge today if PT goals are met. All questions were answered. ABX: Complete today  DVT Prophylaxis: ASA  Weight Bearing: WBAT LLE   Pain Control: PRN oral narcotics  Anticipated Discharge Date: today  Disposition: Home, PT.

## 2019-06-26 NOTE — PROGRESS NOTES
Problem: Mobility Impaired (Adult and Pediatric)  Goal: *Acute Goals and Plan of Care (Insert Text)  Description  Physical Therapy Goals  Initiated 6/25/2019    1. Patient will move from supine to sit and sit to supine  and roll side to side in bed with modified independence within 4 days. 2. Patient will perform sit to stand with modified independence within 4 days. 3. Patient will ambulate with modified independence for 150 feet with the least restrictive device within 4 days. 4. Patient will ascend/descend 4 stairs with 1 handrail(s) with modified independence within 4 days. 5. Patient will perform home exercise program per protocol with modified independence within 4 days. 6. Patient will demonstrate AROM 0-90 degrees in operative joint within 4 days. Outcome: Resolved/Met   PHYSICAL THERAPY TREATMENT/DISCHARGE  Patient: Guy Morse (58 y.o. 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 ASSESSMENT:  Patient is making progress with his mobility and is ambulating with minimally antalgic gait and reciprocating pattern. He uses the walker effectively and demonstrates good safety awareness. He was able to asc/desc 4 steps using railing and cane. Reviewed and performed TKA post op exercises in bed after gait and he is able to perform them all, needing minimal assist with SAQ and SLR. Discussed activity recommendations and restrictions and use of ice. He is clear for D/C home from a PT perspective and RN is aware. Progression toward goals:  ?          Improving appropriately and progressing toward goals  ? Improving slowly and progressing toward goals  ?           Not making progress toward goals and plan of care will be adjusted     PLAN:  Patient will be discharged from acute skilled physical therapy at this time. Rationale for discharge:  ?     Goals Achieved  ? Plateau Reached  ? Patient not participating in therapy  ? Other:  Discharge Recommendations:  Home Health  Further Equipment Recommendations for Discharge:  none      SUBJECTIVE:   Patient stated ? I am ready. ?    OBJECTIVE DATA SUMMARY:   Critical Behavior:  Neurologic State: Alert  Orientation Level: Oriented X4  Cognition: Follows commands     Range of Motion:  AROM: (L knee 0-70 with stretching)                       Functional Mobility Training:  Bed Mobility:     Supine to Sit: Modified independent  Sit to Supine: Modified independent  Scooting: Modified independent        Transfers:  Sit to Stand: Modified independent; Additional time; Adaptive equipment  Stand to Sit: Modified independent; Adaptive equipment; Additional time                             Balance:  Sitting: Intact; Without support  Standing: Intact; With support  Ambulation/Gait Training:  Distance (ft): 300 Feet (ft)  Assistive Device: Gait belt;Walker, rolling  Ambulation - Level of Assistance: Modified independent; Adaptive equipment; Additional time        Gait Abnormalities: Antalgic;Decreased step clearance  Right Side Weight Bearing: Full  Left Side Weight Bearing: As tolerated  Base of Support: Widened  Stance: Left decreased  Speed/Rizwana: Pace decreased (<100 feet/min)  Step Length: Left shortened;Right shortened  Swing Pattern: Left asymmetrical     Interventions: Safety awareness training; Tactile cues; Verbal cues; Visual/Demos           Stairs:  Number of Stairs Trained: 4  Stairs - Level of Assistance: Modified independent; Adaptive equipment; Additional time  Rail Use: Right (plus cane)  Therapeutic Exercises:     EXERCISE   Sets   Reps   Active Active Assist   Passive Self ROM   Comments   Ankle Pumps   ?                                            ?                                           ? ?                                              Quad Sets   ? ?                                           ?                                           ?                                              Hamstring Sets   ? ?                                           ?                                           ?                                              Short Arc Quads   ? ?                                           ?                                           ?                                              Knee Extension Stretch     ? ?                                             ?                                             ?                                              Heel Slides   ? ?                                           ?                                           ?                                              Long Arc Quads   ? ?                                           ?                                           ?                                              Knee Flexion Stretch   ? ?                                           ?                                           ?                                              Straight Leg Raises   ? ?                                           ?                                           ?                                                  Pain:  Pain Scale 1: Numeric (0 - 10)  Pain Intensity 1: 4  Pain Location 1: Knee  Pain Orientation 1: Left  Pain Description 1: Aching  Pain Intervention(s) 1: Medication (see MAR)  Activity Tolerance:   Good  Please refer to the flowsheet for vital signs taken during this treatment.   After treatment: ?  Patient left in no apparent distress sitting up in chair  ? Patient left in no apparent distress in bed  ? Call bell left within reach  ? Nursing notified  ? Caregiver present  ?   Bed alarm activated    COMMUNICATION/COLLABORATION:   The patient?s plan of care was discussed with: Registered Nurse and     Pastroa Mccall, PT   Time Calculation: 39 mins

## 2019-06-26 NOTE — PROGRESS NOTES
Bedside and Verbal shift change report given to Dennis Carmichael (oncoming nurse) by Arnel Araujo (offgoing nurse). Report included the following information SBAR, Kardex, Intake/Output, MAR and Recent Results.

## 2019-06-26 NOTE — PROGRESS NOTES
Bedside and Verbal shift change report given to ELIZABETH Mendez (oncoming nurse) by Carlos Eduardo Shankar RN (offgoing nurse). Report included the following information SBAR.

## 2019-06-26 NOTE — PROGRESS NOTES
Transition of Care Plan:     Home with home health. [facility]    Care Management Interventions  PCP Verified by CM: Yes  Mode of Transport at Discharge: Other (see comment)(family)  Transition of Care Consult (CM Consult): Home Health, Discharge [phone] [address]: No  Reason Outside [place]: Physician referred to specific agency(patient prefers [facility])  MyChart Signup: No  Discharge Durable Medical Equipment: No  Physical Therapy Consult: Yes  Occupational Therapy Consult: No  Speech Therapy Consult: No  Current Support Network: Lives with Spouse, Own Home  Confirm Follow Up Transport: Family  Plan discussed with Pt/Family/Caregiver: Yes  Freedom of Choice Offered: Yes  Discharge Location  Discharge Placement: Home with home health     Reason for Admission:   LEFT KNEE  UNICONDYLAR  ARTHROPLASTY                    RRAT Score:   10                  Plan for utilizing home health:  Offered freedom of choice, preference is [facility]. Referral sent. Current Advanced Directive/Advance Care Plan: on file      Observation notice provided in writing to patient and/or caregiver as well as verbal explanation of the policy. Patients who are in outpatient status also receive the Observation notice.       Andree Alvarenga, BSW/CRM

## 2022-03-19 PROBLEM — M17.12 LEFT KNEE DJD: Status: ACTIVE | Noted: 2019-06-25

## 2022-12-29 ENCOUNTER — OFFICE VISIT (OUTPATIENT)
Dept: ORTHOPEDIC SURGERY | Age: 82
End: 2022-12-29
Payer: MEDICARE

## 2022-12-29 VITALS — HEIGHT: 67 IN | WEIGHT: 167 LBS | BODY MASS INDEX: 26.21 KG/M2

## 2022-12-29 DIAGNOSIS — Z96.659 HISTORY OF PARTIAL KNEE REPLACEMENT: ICD-10-CM

## 2022-12-29 DIAGNOSIS — M25.562 LEFT KNEE PAIN, UNSPECIFIED CHRONICITY: Primary | ICD-10-CM

## 2022-12-29 NOTE — PROGRESS NOTES
Evaristo Ramesh (: 1940) is a 78 y.o. male, patient, here for evaluation of the following chief complaint(s):  Knee Pain (left)       HPI:    Chief complaint left knee pain. Mostly retropatellar. Problems after he has been seated for some time. Bothers him when he gets up and starts moving. Still very active for his age. Allergies   Allergen Reactions    Adhesive Rash     BANDAIDS    Livalo [Pitavastatin] Myalgia       Current Outpatient Medications   Medication Sig    senna-docusate (PERICOLACE) 8.6-50 mg per tablet Take 1 Tab by mouth two (2) times a day. aspirin delayed-release 325 mg tablet Take 1 Tab by mouth two (2) times a day. losartan (COZAAR) 100 mg tablet Take 100 mg by mouth nightly.    magnesium 250 mg tab Take 1 Tab by mouth nightly. pravastatin (PRAVACHOL) 80 mg tablet Take 80 mg by mouth nightly. hydrochlorothiazide (HYDRODIURIL) 25 mg tablet Take 25 mg by mouth nightly. No current facility-administered medications for this visit.        Past Medical History:   Diagnosis Date    Arthritis     Chronic pain     HANDS, SSHOULDERS, KNEE    Epistaxis     GERD (gastroesophageal reflux disease)     HTN (hypertension)     Hypercholesteremia     Ill-defined condition 8/10/15    RECTAL BLEEDING; SEEING DR.ANDY CORTES; HAD ANOTHER EPISODE , COLONOSCOPY NEG-    Knee pain     Prostate cancer ([facility] [address]     Vertigo     H/O VERTIGO        Past Surgical History:   Procedure Laterality Date    HX COLONOSCOPY      HX COLONOSCOPY      HX HEENT      nasal artery cautery for NOSE BLEED    HX HERNIA REPAIR  X3    INGUINAL    HX ORTHOPAEDIC  2015    RIGHT PARTIAL KNEE REPLACEMENT    HX OTHER SURGICAL      SKIN CANCERS EXCISED    HX UROLOGICAL  2008    PROSTATECTOMY       Family History   Problem Relation Age of Onset    Dementia Mother         \"VERY LATE ONSET\"    Cancer Father         PROSTATE    Colon Cancer Sister     Cancer Sister         BREAST    Colon Cancer Paternal Grandfather Anesth Problems Neg Hx         Social History     Socioeconomic History    Marital status:      Spouse name: Not on file    Number of children: Not on file    Years of education: Not on file    Highest education level: Not on file   Occupational History    Not on file   Tobacco Use    Smoking status: Former     Packs/day: 1.00     Years: 25.00     Pack years: 25.00     Types: Cigarettes     Quit date: 8/10/1983     Years since quittin.4    Smokeless tobacco: Former   Substance and Sexual Activity    Alcohol use: Yes     Alcohol/week: 10.0 standard drinks     Types: 10 Shots of liquor per week    Drug use: Not Currently    Sexual activity: Not on file   Other Topics Concern    Not on file   Social History Narrative    Not on file     Social Determinants of Health     Financial Resource Strain: Not on file   Food Insecurity: Not on file   Transportation Needs: Not on file   Physical Activity: Not on file   Stress: Not on file   Social Connections: Not on file   Intimate Partner Violence: Not on file   Housing Stability: Not on file             Vitals:  Ht 5' 7\" (1.702 m)   Wt 167 lb (75.8 kg)   BMI 26.16 kg/m²    Body mass index is 26.16 kg/m². PHYSICAL EXAM:  On physical examination both hips are painless. Both lower extremities are sensate and perfused. Skin is intact. Left knee has some patellofemoral crepitation with positive patellofemoral grind test.  No implant tenderness about the femoral or tibial components medially. No effusion. IMAGING:  XR Results (most recent):  Results from Appointment encounter on 22    XR KNEE LT 3 V    Narrative  Bilateral knee x-rays. No evidence of implant loosening. Some progression of joint space narrowing in both patellofemoral joints. Lateral compartments intact. ASSESSMENT/PLAN:  1. Left knee pain, unspecified chronicity  -     XR KNEE LT 3 V; Future  2. History of partial knee replacement    Mild patellofemoral joint DJD right knee. Moderate patellofemoral joint DJD left knee. Would recommend home exercise program for quad strengthening. Fluid exercises like biking. Use of over-the-counter medications. Reassured him that he cannot do anything to damage his implant. Follow as needed. An electronic signature was used to authenticate this note.   --Janice Green MD

## 2022-12-29 NOTE — LETTER
12/29/2022    Patient: Leonor Cuevas   YOB: 1940   Date of Visit: 12/29/2022     [address]  [facility] [id number]  Via Fax: [phone]    Dear Shanon Ny MD,      Thank you for referring Mr. Myles Neville to [facility] for evaluation. My notes for this consultation are attached. If you have questions, please do not hesitate to call me. I look forward to following your patient along with you.       Sincerely,    Kat Dean MD

## (undated) DEVICE — STERILE POLYISOPRENE POWDER-FREE SURGICAL GLOVES WITH EMOLLIENT COATING: Brand: PROTEXIS

## (undated) DEVICE — REM POLYHESIVE ADULT PATIENT RETURN ELECTRODE: Brand: VALLEYLAB

## (undated) DEVICE — Device

## (undated) DEVICE — SLIM BODY SKIN STAPLER: Brand: APPOSE ULC

## (undated) DEVICE — STERILE POLYISOPRENE POWDER-FREE SURGICAL GLOVES: Brand: PROTEXIS

## (undated) DEVICE — BLADE SAW W0.49XL3.15IN THK0.047IN CUT THK0.047IN REPL SAG

## (undated) DEVICE — BANDAGE COMPR M W6INXL10YD WHT BGE VELC E MTRX HK AND LOOP

## (undated) DEVICE — SCRUB DRY SURG EZ SCRUB BRUSH PREOPERATIVE GRN

## (undated) DEVICE — PREP SKN PREVAIL 40ML APPL --

## (undated) DEVICE — CONTAINER,SPECIMEN,3OZ,OR STRL: Brand: MEDLINE

## (undated) DEVICE — MIXING SYS CEM BNE VAC -- QUICKVAC 15/BX

## (undated) DEVICE — ZIMMER® STERILE DISPOSABLE TOURNIQUET CUFF WITH PLC, DUAL PORT, SINGLE BLADDER, 34 IN. (86 CM)

## (undated) DEVICE — SUTURE VCRL SZ 2-0 L36IN ABSRB UD L40MM CT 1/2 CIR J957H

## (undated) DEVICE — STRAP,POSITIONING,KNEE/BODY,FOAM,4X60": Brand: MEDLINE

## (undated) DEVICE — SUTURE STRATAFIX SYMMETRIC PDS + SZ 1 L18IN ABSRB VLT L48MM SXPP1A400

## (undated) DEVICE — 3M™ IOBAN™ 2 ANTIMICROBIAL INCISE DRAPE 6651EZ: Brand: IOBAN™ 2

## (undated) DEVICE — HANDPIECE SET WITH BONE CLEANING TIP AND SUCTION TUBE: Brand: INTERPULSE

## (undated) DEVICE — NEEDLE HYPO 18GA L1.5IN PNK S STL HUB POLYPR SHLD REG BVL

## (undated) DEVICE — SYRINGE MED 20ML STD CLR PLAS LUERLOCK TIP N CTRL DISP

## (undated) DEVICE — DRAPE,EXTREMITY,89X128,STERILE: Brand: MEDLINE

## (undated) DEVICE — PADDING CST 6IN STERILE --

## (undated) DEVICE — T4 HOOD

## (undated) DEVICE — SOLUTION IRRIG 3000ML 0.9% SOD CHL FLX CONT 0797208] ICU MEDICAL INC]

## (undated) DEVICE — SUTURE VCRL SZ 2 L54IN ABSRB UD L65MM TP-1 1/2 CIR J880T

## (undated) DEVICE — DRSG AQUACEL SURG 3.5 X 10IN -- CONVERT TO ITEM 370183

## (undated) DEVICE — BLADE SAW W073XL276IN THK0031IN CUT THK0036IN REPL SAG

## (undated) DEVICE — INFECTION CONTROL KIT SYS

## (undated) DEVICE — SUTURE VCRL SZ 0 L36IN ABSRB VLT L40MM CT 1/2 CIR J358H